# Patient Record
Sex: MALE | Race: WHITE | NOT HISPANIC OR LATINO | URBAN - METROPOLITAN AREA
[De-identification: names, ages, dates, MRNs, and addresses within clinical notes are randomized per-mention and may not be internally consistent; named-entity substitution may affect disease eponyms.]

---

## 2018-01-14 ENCOUNTER — EMERGENCY (EMERGENCY)
Facility: HOSPITAL | Age: 73
LOS: 0 days | Discharge: HOME | End: 2018-01-14

## 2018-01-14 DIAGNOSIS — Z59.0 HOMELESSNESS: ICD-10-CM

## 2018-01-14 DIAGNOSIS — R05 COUGH: ICD-10-CM

## 2018-01-14 SDOH — ECONOMIC STABILITY - HOUSING INSECURITY: HOMELESSNESS: Z59.0

## 2018-02-04 ENCOUNTER — EMERGENCY (EMERGENCY)
Facility: HOSPITAL | Age: 73
LOS: 0 days | Discharge: HOME | End: 2018-02-04
Attending: STUDENT IN AN ORGANIZED HEALTH CARE EDUCATION/TRAINING PROGRAM

## 2018-02-04 VITALS
DIASTOLIC BLOOD PRESSURE: 77 MMHG | TEMPERATURE: 98 F | SYSTOLIC BLOOD PRESSURE: 145 MMHG | HEART RATE: 89 BPM | RESPIRATION RATE: 18 BRPM

## 2018-02-04 DIAGNOSIS — R40.0 SOMNOLENCE: ICD-10-CM

## 2018-02-04 DIAGNOSIS — F17.200 NICOTINE DEPENDENCE, UNSPECIFIED, UNCOMPLICATED: ICD-10-CM

## 2018-02-04 NOTE — ED PROVIDER NOTE - NS ED ROS FT
Constitutional:  No fever or chills  Eyes:  No visual changes  ENMT: No neck pain or stiffness  Cardiac:  No chest pain  Respiratory:  No cough or respiratory distress.   GI:  No nausea, vomiting, diarrhea or abdominal pain.  :  No dysuria, frequency or burning.  MS:  No back pain.  Neuro:  No headache   Skin:  No skin rash  Except as documented in the HPI,  all other systems are negative

## 2018-02-04 NOTE — ED PROVIDER NOTE - MEDICAL DECISION MAKING DETAILS
I have personally performed a history and physical exam on this patient and personally directed the management of the patient.

## 2018-02-04 NOTE — ED PROVIDER NOTE - PHYSICAL EXAMINATION
VITAL SIGNS: I have reviewed nursing notes and confirm.  CONSTITUTIONAL: NAD  SKIN: Warm dry  HEAD: NCAT  EYES: NL inspection  ENT: MMM  NECK: Supple; non tender.  CARD: RRR  RESP: CTAB  ABD: S/NT no R/G  EXT: no pedal edema  NEURO: Grossly unremarkable  PSYCH: Cooperative, appropriate.

## 2018-02-04 NOTE — ED PROVIDER NOTE - OBJECTIVE STATEMENT
73 y/o M pmh as noted, here b/c "i'm tired, I went to [facility] and they were closed, so I came here to sleep."  Pt has no complaints.  NO SI, HI, or hallucinations.

## 2018-04-01 ENCOUNTER — EMERGENCY (EMERGENCY)
Facility: HOSPITAL | Age: 73
LOS: 0 days | Discharge: HOME | End: 2018-04-01
Attending: EMERGENCY MEDICINE

## 2018-04-01 VITALS
DIASTOLIC BLOOD PRESSURE: 84 MMHG | RESPIRATION RATE: 20 BRPM | HEART RATE: 94 BPM | SYSTOLIC BLOOD PRESSURE: 139 MMHG | OXYGEN SATURATION: 96 % | TEMPERATURE: 96 F

## 2018-04-01 DIAGNOSIS — R31.9 HEMATURIA, UNSPECIFIED: ICD-10-CM

## 2018-04-01 DIAGNOSIS — R05 COUGH: ICD-10-CM

## 2018-04-01 DIAGNOSIS — F17.200 NICOTINE DEPENDENCE, UNSPECIFIED, UNCOMPLICATED: ICD-10-CM

## 2018-04-01 DIAGNOSIS — J18.9 PNEUMONIA, UNSPECIFIED ORGANISM: ICD-10-CM

## 2018-04-01 RX ORDER — IPRATROPIUM/ALBUTEROL SULFATE 18-103MCG
3 AEROSOL WITH ADAPTER (GRAM) INHALATION ONCE
Qty: 0 | Refills: 0 | Status: COMPLETED | OUTPATIENT
Start: 2018-04-01 | End: 2018-04-01

## 2018-04-01 RX ORDER — AMOXICILLIN 250 MG/5ML
1 SUSPENSION, RECONSTITUTED, ORAL (ML) ORAL
Qty: 21 | Refills: 0 | OUTPATIENT
Start: 2018-04-01 | End: 2018-04-07

## 2018-04-01 RX ORDER — AMOXICILLIN 250 MG/5ML
500 SUSPENSION, RECONSTITUTED, ORAL (ML) ORAL ONCE
Qty: 0 | Refills: 0 | Status: COMPLETED | OUTPATIENT
Start: 2018-04-01 | End: 2018-04-01

## 2018-04-01 RX ADMIN — Medication 3 MILLILITER(S): at 09:08

## 2018-04-01 RX ADMIN — Medication 500 MILLIGRAM(S): at 10:16

## 2018-04-01 NOTE — ED PROVIDER NOTE - OBJECTIVE STATEMENT
72 y/o male current homeless with PMH of drug abuse, bipolar schizoeffective type presents to ED for cough that started about a year ago. Pt said that he's been coughing daily/non productive cough. He denies any N/V/SOB/CP/C/F at the moment. Pt said last time he smoke marijuana was 2 months ago and crack about 5 days ago.

## 2018-04-01 NOTE — ED PROVIDER NOTE - CARE PLAN
Assessment and plan of treatment:	Pneumonia Assessment and plan of treatment:	Pneumonia  amoxicillin 500mg q8 14 days Principal Discharge DX:	Pneumonia of lower lobe due to infectious organism, unspecified laterality  Assessment and plan of treatment:	Pneumonia  amoxicillin 500mg q8 14 days

## 2018-04-01 NOTE — ED PROVIDER NOTE - PHYSICAL EXAMINATION
· Physical Examination: · Physical Examination: CONSTITUTIONAL: Well-developed; well-nourished; in no acute distress.   		SKIN: warm, dry  		HEAD: Normocephalic; atraumatic.  		EYES: no conj injection  		ENT: No nasal discharge; airway clear.  		NECK: Supple; non tender.  		CARD: murmurs noted  		RESP: clear breath sounds  		ABD: soft ntnd  		EXT: Normal ROM.   		LYMPH: No acute cervical adenopathy.  		NEURO: Alert, oriented, grossly unremarkable  PSYCH: Cooperative, appropriate.

## 2018-04-01 NOTE — ED PROVIDER NOTE - ATTENDING CONTRIBUTION TO CARE
73 M PMH undomiciled, bipolar, schizophrenia on haldol depot shot, last 2 days ago, pw cough, nonprod, x approx 1 year. worse this winter so came to check it out. no weight loss. Uses drugs - last used crack, smoked, 5 days ago - quitting. No weight loss, fever, chills, chest pain, leg swelling.  Exam: NAD, NCAT, HEENT: no OP erythema or swelling of oral structures, airway patent, EOMI, PERRLA 4mm, Neck: supple, nontender, nl ROM, Heart: RRR, loud holosystolic murmur - pt aware, Lungs: bibasilar rhonchi, no wheezing or crackles, no signs of increased WOB, Abd: NTND, no guarding or rebound, no hernia palpated, MSK: chest, back, and ext nontender, nl rom, no deformity. Neuro: A&Ox3, CN II-XII intact, normal strength 5/5 all 4 ext, nl sensation throughout, normal speech and coordination.   A/P: Eval for PNA. treat cough in ED for comfort. Reassess.

## 2018-04-01 NOTE — ED PROVIDER NOTE - MEDICAL DECISION MAKING DETAILS
Found to have possible bibasilar PNA. treating with affordable option given financial situation poor for pt. Patient to be discharged from ED. Any available test results were discussed with patient. Verbal instructions given, including instructions to return to ED immediately for any new, worsening, or concerning symptoms. Patient endorsed understanding. Written discharge instructions additionally given, including follow-up plan.

## 2018-04-03 ENCOUNTER — EMERGENCY (EMERGENCY)
Facility: HOSPITAL | Age: 73
LOS: 0 days | Discharge: HOME | End: 2018-04-03
Attending: EMERGENCY MEDICINE

## 2018-04-03 VITALS
DIASTOLIC BLOOD PRESSURE: 87 MMHG | HEART RATE: 89 BPM | TEMPERATURE: 96 F | SYSTOLIC BLOOD PRESSURE: 137 MMHG | OXYGEN SATURATION: 98 % | RESPIRATION RATE: 18 BRPM

## 2018-04-03 DIAGNOSIS — R05 COUGH: ICD-10-CM

## 2018-04-03 DIAGNOSIS — Z79.2 LONG TERM (CURRENT) USE OF ANTIBIOTICS: ICD-10-CM

## 2018-04-03 NOTE — ED PROVIDER NOTE - MEDICAL DECISION MAKING DETAILS
Patient has no complaints and unremarkable medical clearance exam. He has steady gait, was offered help to get to a shelter and declined, he ate and is stable for discharge.

## 2018-04-03 NOTE — ED PROVIDER NOTE - NS ED ROS FT
Constitutional: no fever, chills, no recent weight loss  Cardiac: No chest pain, SOB or edema.  Respiratory: Chronic cough, currently on amox, no respiratory distress  GI: No nausea, vomiting, diarrhea or abdominal pain.  : No dysuria, frequency, urgency or hematuria  MS: no pain to back or extremities, no loss of ROM, no weakness  Neuro: No headache or weakness. No LOC.  Skin: No skin rash.  Except as documented in the HPI, all other systems are negative.

## 2018-04-03 NOTE — ED PROVIDER NOTE - PHYSICAL EXAMINATION
VITAL SIGNS: I have reviewed nursing notes and confirm.  CONSTITUTIONAL: Well-developed; well-nourished; in no acute distress.  SKIN: Skin exam is warm and dry, no acute rash.  HEAD: Normocephalic; atraumatic.  EYES: PERRL, EOM intact; conjunctiva and sclera clear.  ENT: No nasal discharge; airway clear.  NECK: Supple; non tender.  CARD:  Regular rate and rhythm.  RESP: occasional, dry cough,  no wheezes, rales or rhonchi.  ABD: Normal bowel sounds; soft; non-distended; non-tender  EXT: Normal ROM. No clubbing, cyanosis or edema.  NEURO: Alert, oriented. Grossly unremarkable. No focal deficits.  PSYCH: Cooperative, appropriate.
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
## 2018-06-15 ENCOUNTER — EMERGENCY (EMERGENCY)
Facility: HOSPITAL | Age: 73
LOS: 1 days | Discharge: HOME | End: 2018-06-15
Admitting: EMERGENCY MEDICINE
Payer: MEDICARE

## 2018-06-15 VITALS
SYSTOLIC BLOOD PRESSURE: 170 MMHG | DIASTOLIC BLOOD PRESSURE: 90 MMHG | TEMPERATURE: 99 F | RESPIRATION RATE: 16 BRPM | HEART RATE: 100 BPM | OXYGEN SATURATION: 100 %

## 2018-06-15 PROCEDURE — 99283 EMERGENCY DEPT VISIT LOW MDM: CPT

## 2018-06-15 NOTE — ED PROVIDER NOTE - MEDICAL DECISION MAKING DETAILS
74 y/o M hx Bipolar, Polysubstance Abuse  Medical evaluation performed. There is no clinical evidence of intoxication or any acute medical problem requiring immediate intervention. Recommend following up with [facility] (formerly [facility])
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
## 2020-10-13 NOTE — ED PROVIDER NOTE - NS ED MD DISPO DISCHARGE CCDA
Patient/Caregiver provided printed discharge information. [SOB on Exertion] : shortness of breath during exertion [Joint Pain] : joint pain [Joint Stiffness] : joint stiffness [Muscle Weakness] : muscle weakness [Negative] : Allergic/Immunologic

## 2023-02-13 NOTE — ED ADULT TRIAGE NOTE - RESPIRATORY RATE (BREATHS/MIN)
Conveyed below results to patient who verbalized understanding and had no other questions or concerns. RX sent to [facility] in [place].    FYI to Anticoagulation clinic patient is being changed to Cipro 500 mg BID for 10 days.   18
